# Patient Record
Sex: FEMALE | Race: ASIAN
[De-identification: names, ages, dates, MRNs, and addresses within clinical notes are randomized per-mention and may not be internally consistent; named-entity substitution may affect disease eponyms.]

---

## 2019-05-23 ENCOUNTER — APPOINTMENT (OUTPATIENT)
Dept: CARDIOLOGY | Facility: CLINIC | Age: 34
End: 2019-05-23
Payer: MEDICAID

## 2019-05-23 VITALS
WEIGHT: 124 LBS | HEART RATE: 59 BPM | TEMPERATURE: 98.3 F | SYSTOLIC BLOOD PRESSURE: 99 MMHG | HEIGHT: 60.24 IN | RESPIRATION RATE: 17 BRPM | DIASTOLIC BLOOD PRESSURE: 63 MMHG | BODY MASS INDEX: 24.03 KG/M2 | OXYGEN SATURATION: 100 %

## 2019-05-23 DIAGNOSIS — R07.89 OTHER CHEST PAIN: ICD-10-CM

## 2019-05-23 PROBLEM — Z00.00 ENCOUNTER FOR PREVENTIVE HEALTH EXAMINATION: Status: ACTIVE | Noted: 2019-05-23

## 2019-05-23 PROCEDURE — 93015 CV STRESS TEST SUPVJ I&R: CPT

## 2019-05-23 PROCEDURE — 93306 TTE W/DOPPLER COMPLETE: CPT

## 2019-05-23 PROCEDURE — ZZZZZ: CPT

## 2019-05-23 PROCEDURE — 99204 OFFICE O/P NEW MOD 45 MIN: CPT | Mod: 25

## 2019-06-15 NOTE — HISTORY OF PRESENT ILLNESS
[FreeTextEntry1] : 33 year-old female with no significant PMH presents for evaluation of CP.  [place], she felt pain on her L face, L neck, L shoulder and L chest.  She also reports L sided CP 3 days ago and felt anxious.  Pain was worse with inspiration or with coughing.  It is non-tender. Patient denies SOB.  She reports palpitations.  She had two episodes of syncope when she saw her  cut his finger off and when she saw her son in the hospital after a bad car accident.

## 2019-06-15 NOTE — DISCUSSION/SUMMARY
[FreeTextEntry1] : 33 year-old female with no significant PMH presents for evaluation of CP.  [place], she felt pain on her L face, L neck, L shoulder and L chest.  She also reports L sided CP 3 days ago and felt anxious.  Pain was worse with inspiration or with coughing.  It is non-tender. Patient denies SOB.  She reports palpitations.  She had two episodes of syncope when she saw her  cut his finger off and when she saw her son in the hospital after a bad car accident.   \par \par (1) Chest discomfort, non-exertional - Patient underwent an echocardiogram and it showed normal LV function without significant valvular pathology.  Patient underwent a treadmill stress test and completed 10 minutes of Chase protocol.  There was no ECG evidence of ischemia.  Patient appears to be at low risk for having significant CAD.  Her symptom may be musculoskeletal in etiology. \par  \par (2) Followup - as needed.

## 2019-06-15 NOTE — PHYSICAL EXAM
[General Appearance - Well Developed] : well developed [Normal Appearance] : normal appearance [Well Groomed] : well groomed [General Appearance - Well Nourished] : well nourished [No Deformities] : no deformities [General Appearance - In No Acute Distress] : no acute distress [Normal Conjunctiva] : the conjunctiva exhibited no abnormalities [Eyelids - No Xanthelasma] : the eyelids demonstrated no xanthelasmas [No Oral Pallor] : no oral pallor [Normal Oral Mucosa] : normal oral mucosa [No Oral Cyanosis] : no oral cyanosis [Normal Jugular Venous V Waves Present] : normal jugular venous V waves present [Normal Jugular Venous A Waves Present] : normal jugular venous A waves present [No Jugular Venous Mishra A Waves] : no jugular venous mishra A waves [Heart Rate And Rhythm] : heart rate and rhythm were normal [Heart Sounds] : normal S1 and S2 [Exaggerated Use Of Accessory Muscles For Inspiration] : no accessory muscle use [Respiration, Rhythm And Depth] : normal respiratory rhythm and effort [Auscultation Breath Sounds / Voice Sounds] : lungs were clear to auscultation bilaterally [Abdomen Soft] : soft [Abdomen Tenderness] : non-tender [Abdomen Mass (___ Cm)] : no abdominal mass palpated [Gait - Sufficient For Exercise Testing] : the gait was sufficient for exercise testing [Abnormal Walk] : normal gait [Cyanosis, Localized] : no localized cyanosis [Nail Clubbing] : no clubbing of the fingernails [Petechial Hemorrhages (___cm)] : no petechial hemorrhages [] : no ischemic changes [Affect] : the affect was normal [Oriented To Time, Place, And Person] : oriented to person, place, and time [Mood] : the mood was normal [No Anxiety] : not feeling anxious [Arterial Pulses Normal] : the arterial pulses were normal [Edema] : no peripheral edema present [FreeTextEntry1] : 1/6 CHANDRA at upper sternal borders